# Patient Record
Sex: MALE | Race: WHITE | NOT HISPANIC OR LATINO | URBAN - METROPOLITAN AREA
[De-identification: names, ages, dates, MRNs, and addresses within clinical notes are randomized per-mention and may not be internally consistent; named-entity substitution may affect disease eponyms.]

---

## 2020-03-05 ENCOUNTER — EMERGENCY (EMERGENCY)
Facility: HOSPITAL | Age: 35
LOS: 0 days | Discharge: HOME | End: 2020-03-05
Attending: EMERGENCY MEDICINE
Payer: COMMERCIAL

## 2020-03-05 VITALS — WEIGHT: 195.11 LBS | HEIGHT: 71 IN

## 2020-03-05 VITALS — DIASTOLIC BLOOD PRESSURE: 72 MMHG | SYSTOLIC BLOOD PRESSURE: 129 MMHG | HEART RATE: 68 BPM

## 2020-03-05 DIAGNOSIS — R42 DIZZINESS AND GIDDINESS: ICD-10-CM

## 2020-03-05 DIAGNOSIS — R51 HEADACHE: ICD-10-CM

## 2020-03-05 DIAGNOSIS — R11.0 NAUSEA: ICD-10-CM

## 2020-03-05 DIAGNOSIS — F32.9 MAJOR DEPRESSIVE DISORDER, SINGLE EPISODE, UNSPECIFIED: ICD-10-CM

## 2020-03-05 LAB
ALBUMIN SERPL ELPH-MCNC: 3.9 G/DL — SIGNIFICANT CHANGE UP (ref 3.3–5)
ALP SERPL-CCNC: 70 U/L — SIGNIFICANT CHANGE UP (ref 40–120)
ALT FLD-CCNC: 24 U/L — SIGNIFICANT CHANGE UP (ref 12–78)
ANION GAP SERPL CALC-SCNC: 4 MMOL/L — LOW (ref 5–17)
APPEARANCE UR: CLEAR — SIGNIFICANT CHANGE UP
APTT BLD: 31.5 SEC — SIGNIFICANT CHANGE UP (ref 27.5–36.3)
AST SERPL-CCNC: 25 U/L — SIGNIFICANT CHANGE UP (ref 15–37)
BASOPHILS # BLD AUTO: 0.03 K/UL — SIGNIFICANT CHANGE UP (ref 0–0.2)
BASOPHILS NFR BLD AUTO: 0.4 % — SIGNIFICANT CHANGE UP (ref 0–2)
BILIRUB SERPL-MCNC: 0.5 MG/DL — SIGNIFICANT CHANGE UP (ref 0.2–1.2)
BILIRUB UR-MCNC: NEGATIVE — SIGNIFICANT CHANGE UP
BUN SERPL-MCNC: 17 MG/DL — SIGNIFICANT CHANGE UP (ref 7–23)
CALCIUM SERPL-MCNC: 9.1 MG/DL — SIGNIFICANT CHANGE UP (ref 8.5–10.1)
CHLORIDE SERPL-SCNC: 107 MMOL/L — SIGNIFICANT CHANGE UP (ref 96–108)
CO2 SERPL-SCNC: 26 MMOL/L — SIGNIFICANT CHANGE UP (ref 22–31)
COLOR SPEC: YELLOW — SIGNIFICANT CHANGE UP
CREAT SERPL-MCNC: 1.34 MG/DL — HIGH (ref 0.5–1.3)
DIFF PNL FLD: ABNORMAL
EOSINOPHIL # BLD AUTO: 0.05 K/UL — SIGNIFICANT CHANGE UP (ref 0–0.5)
EOSINOPHIL NFR BLD AUTO: 0.6 % — SIGNIFICANT CHANGE UP (ref 0–6)
GLUCOSE SERPL-MCNC: 113 MG/DL — HIGH (ref 70–99)
GLUCOSE UR QL: NEGATIVE MG/DL — SIGNIFICANT CHANGE UP
HCT VFR BLD CALC: 41.7 % — SIGNIFICANT CHANGE UP (ref 39–50)
HGB BLD-MCNC: 14.9 G/DL — SIGNIFICANT CHANGE UP (ref 13–17)
IMM GRANULOCYTES NFR BLD AUTO: 0.4 % — SIGNIFICANT CHANGE UP (ref 0–1.5)
INR BLD: 0.99 RATIO — SIGNIFICANT CHANGE UP (ref 0.88–1.16)
KETONES UR-MCNC: NEGATIVE — SIGNIFICANT CHANGE UP
LEUKOCYTE ESTERASE UR-ACNC: NEGATIVE — SIGNIFICANT CHANGE UP
LYMPHOCYTES # BLD AUTO: 1.46 K/UL — SIGNIFICANT CHANGE UP (ref 1–3.3)
LYMPHOCYTES # BLD AUTO: 18.5 % — SIGNIFICANT CHANGE UP (ref 13–44)
MCHC RBC-ENTMCNC: 30.7 PG — SIGNIFICANT CHANGE UP (ref 27–34)
MCHC RBC-ENTMCNC: 35.7 GM/DL — SIGNIFICANT CHANGE UP (ref 32–36)
MCV RBC AUTO: 86 FL — SIGNIFICANT CHANGE UP (ref 80–100)
MONOCYTES # BLD AUTO: 0.63 K/UL — SIGNIFICANT CHANGE UP (ref 0–0.9)
MONOCYTES NFR BLD AUTO: 8 % — SIGNIFICANT CHANGE UP (ref 2–14)
NEUTROPHILS # BLD AUTO: 5.69 K/UL — SIGNIFICANT CHANGE UP (ref 1.8–7.4)
NEUTROPHILS NFR BLD AUTO: 72.1 % — SIGNIFICANT CHANGE UP (ref 43–77)
NITRITE UR-MCNC: NEGATIVE — SIGNIFICANT CHANGE UP
PH UR: 6 — SIGNIFICANT CHANGE UP (ref 5–8)
PLATELET # BLD AUTO: 162 K/UL — SIGNIFICANT CHANGE UP (ref 150–400)
POTASSIUM SERPL-MCNC: 3.5 MMOL/L — SIGNIFICANT CHANGE UP (ref 3.5–5.3)
POTASSIUM SERPL-SCNC: 3.5 MMOL/L — SIGNIFICANT CHANGE UP (ref 3.5–5.3)
PROT SERPL-MCNC: 7.5 GM/DL — SIGNIFICANT CHANGE UP (ref 6–8.3)
PROT UR-MCNC: 15 MG/DL
PROTHROM AB SERPL-ACNC: 11 SEC — SIGNIFICANT CHANGE UP (ref 10–12.9)
RBC # BLD: 4.85 M/UL — SIGNIFICANT CHANGE UP (ref 4.2–5.8)
RBC # FLD: 12.2 % — SIGNIFICANT CHANGE UP (ref 10.3–14.5)
SODIUM SERPL-SCNC: 137 MMOL/L — SIGNIFICANT CHANGE UP (ref 135–145)
SP GR SPEC: 1.01 — SIGNIFICANT CHANGE UP (ref 1.01–1.02)
UROBILINOGEN FLD QL: NEGATIVE MG/DL — SIGNIFICANT CHANGE UP
WBC # BLD: 7.89 K/UL — SIGNIFICANT CHANGE UP (ref 3.8–10.5)
WBC # FLD AUTO: 7.89 K/UL — SIGNIFICANT CHANGE UP (ref 3.8–10.5)

## 2020-03-05 PROCEDURE — 80053 COMPREHEN METABOLIC PANEL: CPT

## 2020-03-05 PROCEDURE — 70450 CT HEAD/BRAIN W/O DYE: CPT

## 2020-03-05 PROCEDURE — 36415 COLL VENOUS BLD VENIPUNCTURE: CPT

## 2020-03-05 PROCEDURE — 85025 COMPLETE CBC W/AUTO DIFF WBC: CPT

## 2020-03-05 PROCEDURE — 86850 RBC ANTIBODY SCREEN: CPT

## 2020-03-05 PROCEDURE — 99284 EMERGENCY DEPT VISIT MOD MDM: CPT

## 2020-03-05 PROCEDURE — 81001 URINALYSIS AUTO W/SCOPE: CPT

## 2020-03-05 PROCEDURE — 85730 THROMBOPLASTIN TIME PARTIAL: CPT

## 2020-03-05 PROCEDURE — 70450 CT HEAD/BRAIN W/O DYE: CPT | Mod: 26

## 2020-03-05 PROCEDURE — 99284 EMERGENCY DEPT VISIT MOD MDM: CPT | Mod: 25

## 2020-03-05 PROCEDURE — 86900 BLOOD TYPING SEROLOGIC ABO: CPT

## 2020-03-05 PROCEDURE — 96375 TX/PRO/DX INJ NEW DRUG ADDON: CPT

## 2020-03-05 PROCEDURE — 96365 THER/PROPH/DIAG IV INF INIT: CPT

## 2020-03-05 PROCEDURE — 85610 PROTHROMBIN TIME: CPT

## 2020-03-05 PROCEDURE — 86901 BLOOD TYPING SEROLOGIC RH(D): CPT

## 2020-03-05 RX ORDER — SODIUM CHLORIDE 9 MG/ML
1000 INJECTION INTRAMUSCULAR; INTRAVENOUS; SUBCUTANEOUS ONCE
Refills: 0 | Status: COMPLETED | OUTPATIENT
Start: 2020-03-05 | End: 2020-03-05

## 2020-03-05 RX ORDER — METOCLOPRAMIDE HCL 10 MG
10 TABLET ORAL ONCE
Refills: 0 | Status: COMPLETED | OUTPATIENT
Start: 2020-03-05 | End: 2020-03-05

## 2020-03-05 RX ORDER — DIPHENHYDRAMINE HCL 50 MG
50 CAPSULE ORAL ONCE
Refills: 0 | Status: COMPLETED | OUTPATIENT
Start: 2020-03-05 | End: 2020-03-05

## 2020-03-05 RX ORDER — DEXAMETHASONE 0.5 MG/5ML
10 ELIXIR ORAL ONCE
Refills: 0 | Status: COMPLETED | OUTPATIENT
Start: 2020-03-05 | End: 2020-03-05

## 2020-03-05 RX ADMIN — Medication 102 MILLIGRAM(S): at 20:17

## 2020-03-05 RX ADMIN — Medication 10 MILLIGRAM(S): at 20:14

## 2020-03-05 RX ADMIN — Medication 10 MILLIGRAM(S): at 20:50

## 2020-03-05 RX ADMIN — SODIUM CHLORIDE 1000 MILLILITER(S): 9 INJECTION INTRAMUSCULAR; INTRAVENOUS; SUBCUTANEOUS at 21:30

## 2020-03-05 RX ADMIN — Medication 50 MILLIGRAM(S): at 20:14

## 2020-03-05 NOTE — ED PROVIDER NOTE - CONSTITUTIONAL, MLM
normal... Well appearing, awake, alert, oriented to person, place, time/situation and in no apparent distress. Awake, alert, oriented to person, place, time/situation and +mildly uncomfortable

## 2020-03-05 NOTE — ED PROVIDER NOTE - PROGRESS NOTE DETAILS
Headache almost completely resolved.  CT imaging neg.   Discussed DDx sudden, acute headache -- meningitis, SAH.  Unlikely meningitis given no infectious symptoms, meningeal symptoms, photo/phono phobia.  Also not typical for SAH -- not thunderclap, no neck pain/stiffness, not associated with exertion/straining.  [place] SAH score = 0. Most likely headache related to missing dose of SSRI + inadequate sleep + poor PO intake + stressful travel.  Patient wishes to be d/c'd home -- instructed to return with any recurrent/worsening headache. Rec f/u with PMD +.- neuro [place] Subarachnoid Hemorrhage (SAH) Rule for Headache Evaluation from [email or web address]  on 3/5/2020  RESULT SUMMARY: Ruled Out  This patient can be ruled out for subarachnoid hemorrhage by the [place] SAH Rule, which was 100% sensitive for SAH in its validation.  INPUTS: Age =40 —> 0 = No Neck pain or stiffness —> 0 = No Witnessed loss of consciousness —> 0   No Onset during exertion —> 0 = No Thunderclap headache (peaking pain within 1 second) —> 0 = No  Limited neck flexion on examination —> 0 = No

## 2020-03-05 NOTE — ED ADULT NURSE NOTE - OBJECTIVE STATEMENT
Pt A&Ox3, c/o headache.  Pt reports mild headache after flight from [place] this morning that suddenly got very severe about 30 min PTA, pt describing the headache as throbbing, rating 10/10.  Pt reports nausea and dizziness, pt reports the flight was very turbulent.  Pt has hx of depression on lexapro, pt states "if I don't take Lexapro I develop a headache but nothing this bad." Pt reports last dose of lexapro at 2pm today.

## 2020-03-05 NOTE — ED PROVIDER NOTE - CARE PROVIDER_API CALL
Martha Mistry (MD)  Clinical Neurophysiology; EEGEpilepsy; Neurology; Sleep Medicine  [address] [facility], [place] [address]  Phone: [phone]  Fax: [phone]  Follow Up Time:

## 2020-03-05 NOTE — ED PROVIDER NOTE - OBJECTIVE STATEMENT
33 y/o m with PMHx of depression on Lexapro presenting tot he ED c/o severe HA, dizziness, nausea that began x40 minutes PTA. Pt had a GUERRA throughout the day today but after taking a shower tonight he walked downstairs and then sx became severe, almost passed out. Took a flight home from [place] from 8am-11am this morning, denies head trauma today. States he is on Lexapro and if he doesn't take it for too long her usually develops a HA. Sx have improved since it became severe. Denies fever, chills, any other acute c/o. 35 y/o m with PMHx of depression on Lexapro presenting tot he ED c/o severe HA, dizziness, nausea that began x40 minutes PTA. Pt had a GUERRA throughout the day today but after taking a shower tonight he walked downstairs and then HA became severe, located on the top of his head, described as constant and throbbing, causing pt to yell and shake, almost passed out from pain. Sx have since improved. Took a flight home from [place] from 8am-11am this morning, denies head trauma today but states plane ride was extra turbulent, hasn't eaten or drank much over past few days and believes he was overworked while in [place]. States he is on Lexapro and if he doesn't take it for too long he usually develops a HA. Sx have never been as severe as today. Took today's dose of Lexapro at 2pm. Denies fever, chills, any other acute c/o.

## 2020-03-05 NOTE — ED ADULT NURSE NOTE - CHIEF COMPLAINT QUOTE
Pt reports extreme headache that began 40 minutes ago after a shower. Pt returned from [place] today. Pt c/o dizziness, nausea.

## 2020-03-05 NOTE — ED PROVIDER NOTE - NSFOLLOWUPINSTRUCTIONS_ED_ALL_ED_FT
Return with any recurrent/worsening symptoms  Follow-up with your regular physician and/or a neurologist  Tylenol or Ibuprofen for pain    Headache    A headache is pain or discomfort felt around the head or neck area. The specific cause of a headache may not be found as there are many types including tension headaches, migraine headaches, and cluster headaches. Watch your condition for any changes. Things you can do to manage your pain include taking over the counter and prescription medications as instructed by your health care provider, lying down in a dark quiet room, limiting stress, getting regular sleep, and refraining from alcohol and tobacco products.    SEEK IMMEDIATE MEDICAL CARE IF YOU HAVE ANY OF THE FOLLOWING SYMPTOMS: fever, vomiting, stiff neck, loss of vision, problems with speech, muscle weakness, loss of balance, trouble walking, passing out, or confusion.

## 2020-03-05 NOTE — ED ADULT NURSE NOTE - NSIMPLEMENTINTERV_GEN_ALL_ED
Implemented All Universal Safety Interventions:  [place] to call system. Call bell, personal items and telephone within reach. Instruct patient to call for assistance. Room bathroom lighting operational. Non-slip footwear when patient is off stretcher. Physically safe environment: no spills, clutter or unnecessary equipment. Stretcher in lowest position, wheels locked, appropriate side rails in place.

## 2020-03-05 NOTE — ED ADULT TRIAGE NOTE - CHIEF COMPLAINT QUOTE
Pt reports extreme headache that began 40 minutes ago after a shower. Pt returned from [place] today. Pt c/o dizziness, nausea.

## 2020-03-05 NOTE — ED PROVIDER NOTE - PATIENT PORTAL LINK FT
You can access the FollowMyHealth Patient Portal offered by [facility] by registering at the following website: [email or web address]. By joining [employer]’s FollowMyHealth portal, you will also be able to view your health information using other applications (apps) compatible with our system.

## 2020-03-05 NOTE — ED STATDOCS - PROGRESS NOTE DETAILS
Rashi Rawls for ED attending Dr. Pop: 35 y/o m with PMHx of depression on Lexapro presenting tot he ED c/o severe HA, dizziness, nausea that began x40 minutes PTA. Pt had a GUERRA throughout the day today but after taking a shower tonight he walked downstairs and then sx became severe, almost passed out. Took a flight home from [place] from 8am-11am this morning, denies head trauma today. States he is on Lexapro and if he doesn't take it for too long her usually develops a HA. Sx have improved since it became severe. Denies fever, chills, any other acute c/o. Due to severe nature of sx, will obtain stat CT and send to main ED for further evaluation.
